# Patient Record
Sex: FEMALE | Race: AMERICAN INDIAN OR ALASKA NATIVE
[De-identification: names, ages, dates, MRNs, and addresses within clinical notes are randomized per-mention and may not be internally consistent; named-entity substitution may affect disease eponyms.]

---

## 2022-05-31 ENCOUNTER — HOSPITAL ENCOUNTER (EMERGENCY)
Dept: HOSPITAL 5 - ED | Age: 22
Discharge: HOME | End: 2022-05-31
Payer: COMMERCIAL

## 2022-05-31 VITALS — DIASTOLIC BLOOD PRESSURE: 88 MMHG | SYSTOLIC BLOOD PRESSURE: 138 MMHG
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
## 2022-05-31 DIAGNOSIS — Z48.02: ICD-10-CM

## 2022-05-31 DIAGNOSIS — S81.012D: Primary | ICD-10-CM

## 2022-05-31 NOTE — EMERGENCY DEPARTMENT REPORT
Suture/Staple Removal





- HPI


Chief Complaint: Laceration/Recheck/Suture


Stated Complaint: STITCH REMOVAL


Time Seen by Provider: 05/31/22 10:41


When Sutures or Staples Placed: 5-7 Days Ago


Wound Location: Patient comes to the ER for suture removal of sutures in her 

left knee





ED Review of Systems


ROS: 


Stated complaint: STITCH REMOVAL


Other details as noted in HPI





Comment: All other systems reviewed and negative





ED Past Medical Hx





- Past Medical History


Previous Medical History?: No





- Surgical History


Past Surgical History?: No





- Family History


Family history: no significant





- Social History


Smoking Status: Never Smoker


Substance Use Type: None





Suture Removal Exam





- Exam


General: 


Vital signs noted. No distress. Alert and acting appropriately.





Wound: No Pathologic Erythema, No Tenderness, No Drainage, No Pus, No Wound 

Dehiscence


Other Systems: 


All other systems reviewed and are unremarkable.








ED Course


                                   Vital Signs











  05/31/22





  10:17


 


Temperature 98.5 F


 


Pulse Rate 79


 


Respiratory 16





Rate 


 


Blood Pressure 98/68


 


O2 Sat by Pulse 100





Oximetry 














ED Recheck MDM





- Core Measures


Measure Exclusions: not indicated





- Differential Diagnosis


Suture/Staple Removal





- Medical Decision Making





Sutures removed without difficulty.  There is no pathological redness.  No 

drainage.  


healing well.





                                   Vital Signs











  05/31/22 05/31/22





  10:17 10:58


 


Temperature 98.5 F 98.7 F


 


Pulse Rate 79 88


 


Respiratory 16 14





Rate  


 


Blood Pressure 98/68 


 


Blood Pressure  138/88





[Right]  


 


O2 Sat by Pulse 100 100





Oximetry  








Patient discharged home with discharge plan of care including diet activity, 

medications and follow-up.


Critical care attestation.: 


If time is entered above; I have spent that time in minutes in the direct care 

of this critically ill patient, excluding procedure time.








ED Disposition


Clinical Impression: 


 Visit for suture removal





Disposition: 01 HOME / SELF CARE / HOMELESS


Is pt being admited?: No


Does the pt Need Aspirin: No


Condition: Stable


Instructions:  Wound Closure Removal, Care After


Additional Instructions: 


keep wound clean and dry 








Referrals: 


BK BEE MD [Staff Physician] - 3-5 Days


Time of Disposition: 10:48